# Patient Record
Sex: MALE | URBAN - METROPOLITAN AREA
[De-identification: names, ages, dates, MRNs, and addresses within clinical notes are randomized per-mention and may not be internally consistent; named-entity substitution may affect disease eponyms.]

---

## 2023-08-02 ENCOUNTER — WEB ENCOUNTER (OUTPATIENT)
Dept: URBAN - METROPOLITAN AREA CLINIC 35 | Facility: CLINIC | Age: 43
End: 2023-08-02

## 2023-08-08 ENCOUNTER — OFFICE VISIT (OUTPATIENT)
Dept: URBAN - METROPOLITAN AREA CLINIC 35 | Facility: CLINIC | Age: 43
End: 2023-08-08
Payer: COMMERCIAL

## 2023-08-08 ENCOUNTER — LAB OUTSIDE AN ENCOUNTER (OUTPATIENT)
Dept: URBAN - METROPOLITAN AREA CLINIC 35 | Facility: CLINIC | Age: 43
End: 2023-08-08

## 2023-08-08 VITALS
SYSTOLIC BLOOD PRESSURE: 124 MMHG | BODY MASS INDEX: 30.36 KG/M2 | OXYGEN SATURATION: 99 % | HEIGHT: 69 IN | DIASTOLIC BLOOD PRESSURE: 82 MMHG | HEART RATE: 79 BPM | WEIGHT: 205 LBS

## 2023-08-08 DIAGNOSIS — R07.89 ATYPICAL CHEST PAIN: ICD-10-CM

## 2023-08-08 DIAGNOSIS — F10.10 ETOH ABUSE: ICD-10-CM

## 2023-08-08 PROBLEM — 15167005: Status: ACTIVE | Noted: 2023-08-08

## 2023-08-08 PROCEDURE — 99204 OFFICE O/P NEW MOD 45 MIN: CPT | Performed by: INTERNAL MEDICINE

## 2023-08-08 PROCEDURE — 99244 OFF/OP CNSLTJ NEW/EST MOD 40: CPT | Performed by: INTERNAL MEDICINE

## 2023-08-08 RX ORDER — SIMVASTATIN 10 MG
2 TABLETS IN THE EVENING TABLET ORAL ONCE A DAY
Status: ACTIVE | COMMUNITY

## 2023-08-08 RX ORDER — LOSARTAN POTASSIUM 50 MG/1
1 TABLET TABLET ORAL ONCE A DAY
Status: ACTIVE | COMMUNITY

## 2023-08-08 NOTE — PHYSICAL EXAM SKIN:
no rashes ,  no jaundice present , good turgor , no masses , no tenderness on palpation, +spider angiomas in upper chest, upper back and face

## 2023-10-12 ENCOUNTER — OUT OF OFFICE VISIT (OUTPATIENT)
Dept: URBAN - METROPOLITAN AREA SURGERY CENTER 8 | Facility: SURGERY CENTER | Age: 43
End: 2023-10-12
Payer: COMMERCIAL

## 2023-10-12 ENCOUNTER — TELEPHONE ENCOUNTER (OUTPATIENT)
Dept: URBAN - METROPOLITAN AREA CLINIC 35 | Facility: CLINIC | Age: 43
End: 2023-10-12

## 2023-10-12 ENCOUNTER — CLAIMS CREATED FROM THE CLAIM WINDOW (OUTPATIENT)
Dept: URBAN - METROPOLITAN AREA CLINIC 4 | Facility: CLINIC | Age: 43
End: 2023-10-12
Payer: COMMERCIAL

## 2023-10-12 DIAGNOSIS — K31.7 POLYP OF STOMACH AND DUODENUM: ICD-10-CM

## 2023-10-12 DIAGNOSIS — K29.60 ADENOPAPILLOMATOSIS GASTRICA: ICD-10-CM

## 2023-10-12 DIAGNOSIS — R68.89 ERYTHEMATOUS MUCOSA: ICD-10-CM

## 2023-10-12 DIAGNOSIS — D13.2 ADENOMA OF DUODENUM: ICD-10-CM

## 2023-10-12 DIAGNOSIS — K29.70 GASTRITIS: ICD-10-CM

## 2023-10-12 PROCEDURE — 43239 EGD BIOPSY SINGLE/MULTIPLE: CPT | Performed by: INTERNAL MEDICINE

## 2023-10-12 PROCEDURE — G8907 PT DOC NO EVENTS ON DISCHARG: HCPCS | Performed by: INTERNAL MEDICINE

## 2023-10-12 PROCEDURE — 00731 ANES UPR GI NDSC PX NOS: CPT | Performed by: REGISTERED NURSE

## 2023-10-12 PROCEDURE — 88342 IMHCHEM/IMCYTCHM 1ST ANTB: CPT | Performed by: PATHOLOGY

## 2023-10-12 PROCEDURE — 88305 TISSUE EXAM BY PATHOLOGIST: CPT | Performed by: PATHOLOGY

## 2023-10-12 RX ORDER — PANTOPRAZOLE SODIUM 40 MG/1
1 TABLET TABLET, DELAYED RELEASE ORAL
Qty: 60 | Refills: 1 | OUTPATIENT
Start: 2023-10-12

## 2023-10-12 RX ORDER — LOSARTAN POTASSIUM 50 MG/1
1 TABLET TABLET ORAL ONCE A DAY
Status: ACTIVE | COMMUNITY

## 2023-10-12 RX ORDER — SIMVASTATIN 10 MG
2 TABLETS IN THE EVENING TABLET ORAL ONCE A DAY
Status: ACTIVE | COMMUNITY

## 2023-11-13 ENCOUNTER — TELEPHONE ENCOUNTER (OUTPATIENT)
Dept: URBAN - METROPOLITAN AREA CLINIC 35 | Facility: CLINIC | Age: 43
End: 2023-11-13

## 2024-01-09 ENCOUNTER — LAB OUTSIDE AN ENCOUNTER (OUTPATIENT)
Dept: URBAN - METROPOLITAN AREA CLINIC 35 | Facility: CLINIC | Age: 44
End: 2024-01-09

## 2024-01-09 ENCOUNTER — DASHBOARD ENCOUNTERS (OUTPATIENT)
Age: 44
End: 2024-01-09

## 2024-01-09 ENCOUNTER — OFFICE VISIT (OUTPATIENT)
Dept: URBAN - METROPOLITAN AREA CLINIC 35 | Facility: CLINIC | Age: 44
End: 2024-01-09
Payer: COMMERCIAL

## 2024-01-09 VITALS
WEIGHT: 209 LBS | SYSTOLIC BLOOD PRESSURE: 120 MMHG | DIASTOLIC BLOOD PRESSURE: 75 MMHG | HEART RATE: 95 BPM | OXYGEN SATURATION: 98 % | BODY MASS INDEX: 30.96 KG/M2 | HEIGHT: 69 IN

## 2024-01-09 DIAGNOSIS — F10.10 ETOH ABUSE: ICD-10-CM

## 2024-01-09 DIAGNOSIS — D13.2 DUODENAL ADENOMA: ICD-10-CM

## 2024-01-09 DIAGNOSIS — K76.6 PORTAL HYPERTENSION: ICD-10-CM

## 2024-01-09 DIAGNOSIS — R07.89 ATYPICAL CHEST PAIN: ICD-10-CM

## 2024-01-09 DIAGNOSIS — Z12.11 COLON CANCER SCREENING: ICD-10-CM

## 2024-01-09 PROBLEM — 34742003: Status: ACTIVE | Noted: 2024-01-09

## 2024-01-09 PROCEDURE — 99214 OFFICE O/P EST MOD 30 MIN: CPT | Performed by: INTERNAL MEDICINE

## 2024-01-09 RX ORDER — PANTOPRAZOLE SODIUM 40 MG/1
1 TABLET TABLET, DELAYED RELEASE ORAL
Qty: 90 | Refills: 1 | OUTPATIENT
Start: 2024-01-09

## 2024-01-09 RX ORDER — FENOFIBRATE 160 MG/1
1 CAPSULE WITH A MEAL TABLET ORAL ONCE A DAY
Status: ACTIVE | COMMUNITY

## 2024-01-09 RX ORDER — PANTOPRAZOLE SODIUM 40 MG/1
1 TABLET TABLET, DELAYED RELEASE ORAL
Qty: 60 | Refills: 1 | Status: ACTIVE | COMMUNITY
Start: 2023-10-12

## 2024-01-09 RX ORDER — SIMVASTATIN 10 MG
2 TABLETS IN THE EVENING TABLET ORAL ONCE A DAY
Status: ACTIVE | COMMUNITY

## 2024-01-09 RX ORDER — ERGOCALCIFEROL CAPSULES, 1.25 MG/1
1 CAPSULE CAPSULE ORAL
Status: ACTIVE | COMMUNITY

## 2024-01-09 RX ORDER — SODIUM, POTASSIUM,MAG SULFATES 17.5-3.13G
177 ML AS DIRECTED SOLUTION, RECONSTITUTED, ORAL ORAL TWICE A DAY
Qty: 354 ML | Refills: 0 | OUTPATIENT
Start: 2024-01-09 | End: 2024-01-10

## 2024-01-09 RX ORDER — LOSARTAN POTASSIUM 50 MG/1
1 TABLET TABLET ORAL ONCE A DAY
Status: ACTIVE | COMMUNITY

## 2024-01-09 NOTE — HPI-ENDOSCOPY (EGD) FOLLOWUP
43 year old male patient with previous history of atypical chest pain and ETOH Abuse presents today for  his follow up EGD. Since the procedure patient denies any complaints. Patient currently  admits 2-3 bowel movements per day with solid consistency without melena, blood, or mucus.    PPI therapy did not make a difference on CP; actually was in ER 2 weeks ago, and all was NL. Patient referred to Cardiology and has appointment on 1/24th; this will be his second cardiac evaluation, initial one was 3-4 yrs ago.  EGD - 10/12/2023 - Impression:   - Z-line regular, 41 cm from the incisors.  - Normal esophagus.  - Portal hypertensive gastropathy. Biopsied.  - Mildly prominent veins in fundus of stomach that could represent early gastric varices.  - Erythematous duodenopathy. Biopsied.    Pathology:  (A) Duodenum, Second Part (D2), Biopsy: TUBULAR ADENOMA(S)/LOW-GRADE DYSPLASIA, INTESTINAL TYPE.  (B) Stomach, Antrum and Body, Biopsy: CHRONIC GASTRITIS, NON-SPECIFIC. No Evidence of H. Pylori Organisms or Intestinal Metaplasia. Negative for Dysplasia or Malignancy

## 2024-04-11 ENCOUNTER — LAB (OUTPATIENT)
Dept: URBAN - METROPOLITAN AREA CLINIC 4 | Facility: CLINIC | Age: 44
End: 2024-04-11
Payer: COMMERCIAL

## 2024-04-11 ENCOUNTER — COL/EGD (OUTPATIENT)
Dept: URBAN - METROPOLITAN AREA SURGERY CENTER 8 | Facility: SURGERY CENTER | Age: 44
End: 2024-04-11
Payer: COMMERCIAL

## 2024-04-11 DIAGNOSIS — D13.2 ADENOMA OF DUODENUM: ICD-10-CM

## 2024-04-11 DIAGNOSIS — D12.3 BENIGN NEOPLASM OF TRANSVERSE COLON: ICD-10-CM

## 2024-04-11 DIAGNOSIS — Z12.11 COLON CANCER SCREENING: ICD-10-CM

## 2024-04-11 DIAGNOSIS — K31.7 POLYP OF STOMACH AND DUODENUM: ICD-10-CM

## 2024-04-11 DIAGNOSIS — D12.3 ADENOMA OF TRANSVERSE COLON: ICD-10-CM

## 2024-04-11 DIAGNOSIS — D12.2 ADENOMA OF ASCENDING COLON: ICD-10-CM

## 2024-04-11 DIAGNOSIS — D12.2 BENIGN NEOPLASM OF ASCENDING COLON: ICD-10-CM

## 2024-04-11 PROCEDURE — 43239 EGD BIOPSY SINGLE/MULTIPLE: CPT | Performed by: INTERNAL MEDICINE

## 2024-04-11 PROCEDURE — 45385 COLONOSCOPY W/LESION REMOVAL: CPT | Performed by: INTERNAL MEDICINE

## 2024-04-11 PROCEDURE — 88305 TISSUE EXAM BY PATHOLOGIST: CPT | Performed by: PATHOLOGY

## 2024-04-11 PROCEDURE — 45380 COLONOSCOPY AND BIOPSY: CPT | Performed by: INTERNAL MEDICINE

## 2024-04-11 PROCEDURE — 43251 EGD REMOVE LESION SNARE: CPT | Performed by: INTERNAL MEDICINE

## 2024-04-11 RX ORDER — SIMVASTATIN 10 MG
2 TABLETS IN THE EVENING TABLET ORAL ONCE A DAY
Status: ACTIVE | COMMUNITY

## 2024-04-11 RX ORDER — ERGOCALCIFEROL CAPSULES, 1.25 MG/1
1 CAPSULE CAPSULE ORAL
Status: ACTIVE | COMMUNITY

## 2024-04-11 RX ORDER — LOSARTAN POTASSIUM 50 MG/1
1 TABLET TABLET ORAL ONCE A DAY
Status: ACTIVE | COMMUNITY

## 2024-04-11 RX ORDER — FENOFIBRATE 160 MG/1
1 CAPSULE WITH A MEAL TABLET ORAL ONCE A DAY
Status: ACTIVE | COMMUNITY

## 2024-04-11 RX ORDER — PANTOPRAZOLE SODIUM 40 MG/1
1 TABLET TABLET, DELAYED RELEASE ORAL
Qty: 60 | Refills: 1 | Status: ACTIVE | COMMUNITY
Start: 2023-10-12

## 2024-04-11 RX ORDER — PANTOPRAZOLE SODIUM 40 MG/1
1 TABLET TABLET, DELAYED RELEASE ORAL
Qty: 90 | Refills: 1 | Status: ACTIVE | COMMUNITY
Start: 2024-01-09

## 2024-05-02 ENCOUNTER — TELEPHONE ENCOUNTER (OUTPATIENT)
Dept: URBAN - METROPOLITAN AREA CLINIC 35 | Facility: CLINIC | Age: 44
End: 2024-05-02

## 2024-05-07 ENCOUNTER — OFFICE VISIT (OUTPATIENT)
Dept: URBAN - METROPOLITAN AREA CLINIC 35 | Facility: CLINIC | Age: 44
End: 2024-05-07

## 2024-07-16 ENCOUNTER — TELEPHONE ENCOUNTER (OUTPATIENT)
Dept: URBAN - METROPOLITAN AREA CLINIC 33 | Facility: CLINIC | Age: 44
End: 2024-07-16

## 2024-07-16 ENCOUNTER — OFFICE VISIT (OUTPATIENT)
Dept: URBAN - METROPOLITAN AREA CLINIC 35 | Facility: CLINIC | Age: 44
End: 2024-07-16

## 2024-07-16 NOTE — HPI-ENDOSCOPY (EGD) FOLLOWUP
43 year old male patient with previous history of ETOH abuse, Portal hypertension, Duodenal Adenoma, Atypical chest pain, Hypertension and Hypercholesterolemia presents today for his follow up EGD/COL. Since the procedure patient __    Last appointment   Pantoprazole DR 40 mg   ABD CT:     EGD- 4/11/2024 - Impression  - Z-line regular, 41 cm from the incisors.  - Normal esophagus.  - Normal stomach.  - A single duodenal polyp. Complete resection. Polyp tissue not retrieved. Biopsied.    COL - 4/11/2024 - Impression  The quality of the bowel preparation was excellent.  - Diverticulosis in the sigmoid colon.  - One 2 to 3 mm polyp in the mid ascending colon, removed with a cold biopsy forceps. Resected and retrieved.  - Two 4 to 5 mm polyps at the splenic flexure and in the mid transverse colon, removed with a cold snare. Resected and retrieved.  - Non-bleeding internal hemorrhoids.    Pathology (A) Duodenum, Second Part (D2), Biopsy:  TUBULAR ADENOMA(S)/LOW-GRADE DYSPLASIA, INTESTINAL TYPE.  (B) Colon, Ascending, Middle, Polypectomy:  TUBULAR ADENOMA(S).  (C) Colon, Transverse, Middle, Polypectomy:  TUBULAR ADENOMA(S).  (D) Colon, Splenic Flexure, Polypectomy:  TUBULAR ADENOMA(S).